# Patient Record
Sex: FEMALE | Race: WHITE
[De-identification: names, ages, dates, MRNs, and addresses within clinical notes are randomized per-mention and may not be internally consistent; named-entity substitution may affect disease eponyms.]

---

## 2022-05-11 ENCOUNTER — HOSPITAL ENCOUNTER (OUTPATIENT)
Dept: HOSPITAL 33 - SDC-PAIN | Age: 37
Discharge: HOME | End: 2022-05-11
Attending: PSYCHIATRY & NEUROLOGY
Payer: MEDICARE

## 2022-05-11 DIAGNOSIS — M54.12: Primary | ICD-10-CM

## 2022-05-11 DIAGNOSIS — Z79.899: ICD-10-CM

## 2022-05-11 PROCEDURE — 62321 NJX INTERLAMINAR CRV/THRC: CPT

## 2022-05-11 PROCEDURE — 77003 FLUOROGUIDE FOR SPINE INJECT: CPT

## 2022-05-11 PROCEDURE — 84703 CHORIONIC GONADOTROPIN ASSAY: CPT

## 2022-05-11 PROCEDURE — 72040 X-RAY EXAM NECK SPINE 2-3 VW: CPT

## 2022-05-11 NOTE — XRAY
Indication: Cervical BRINDA.



Intraoperative fluoroscopy provided for 25 seconds.  2 digital spot images

submitted for interpretation demonstrates midline posterior needle tip

projecting just posterior to cervical thoracic junction.  Small amount of

contrast injected for needle tip placement.  Correlate with intraoperative

findings/report.

## 2025-02-05 ENCOUNTER — HOSPITAL ENCOUNTER (OUTPATIENT)
Dept: HOSPITAL 33 - SDC-PAIN | Age: 40
Discharge: HOME | End: 2025-02-05
Attending: PSYCHIATRY & NEUROLOGY
Payer: MEDICARE

## 2025-02-05 DIAGNOSIS — M54.16: Primary | ICD-10-CM

## 2025-02-05 LAB — B-HCG SERPL QL: NEGATIVE

## 2025-02-05 PROCEDURE — 72100 X-RAY EXAM L-S SPINE 2/3 VWS: CPT

## 2025-02-05 PROCEDURE — 84703 CHORIONIC GONADOTROPIN ASSAY: CPT

## 2025-02-05 PROCEDURE — 36415 COLL VENOUS BLD VENIPUNCTURE: CPT

## 2025-02-05 PROCEDURE — 77003 FLUOROGUIDE FOR SPINE INJECT: CPT

## 2025-05-01 ENCOUNTER — HOSPITAL ENCOUNTER (OUTPATIENT)
Dept: HOSPITAL 33 - SDC | Age: 40
Discharge: HOME | End: 2025-05-01
Attending: STUDENT IN AN ORGANIZED HEALTH CARE EDUCATION/TRAINING PROGRAM
Payer: MEDICARE

## 2025-05-01 VITALS
RESPIRATION RATE: 20 BRPM | SYSTOLIC BLOOD PRESSURE: 125 MMHG | OXYGEN SATURATION: 98 % | DIASTOLIC BLOOD PRESSURE: 79 MMHG | HEART RATE: 101 BPM

## 2025-05-01 VITALS — TEMPERATURE: 98.3 F

## 2025-05-01 DIAGNOSIS — D16.31: ICD-10-CM

## 2025-05-01 DIAGNOSIS — M25.571: ICD-10-CM

## 2025-05-01 DIAGNOSIS — M21.621: ICD-10-CM

## 2025-05-01 DIAGNOSIS — M76.71: Primary | ICD-10-CM

## 2025-05-01 DIAGNOSIS — M24.071: ICD-10-CM

## 2025-05-01 DIAGNOSIS — M95.8: ICD-10-CM

## 2025-05-01 DIAGNOSIS — M79.671: ICD-10-CM

## 2025-05-01 DIAGNOSIS — M62.461: ICD-10-CM

## 2025-05-01 DIAGNOSIS — M21.961: ICD-10-CM

## 2025-05-01 DIAGNOSIS — M77.31: ICD-10-CM

## 2025-05-01 DIAGNOSIS — M21.371: ICD-10-CM

## 2025-05-01 DIAGNOSIS — M25.471: ICD-10-CM

## 2025-05-01 LAB
ALBUMIN SERPL-MCNC: 4.3 G/DL (ref 3.5–5)
ANION GAP SERPL CALC-SCNC: 14.4 MEQ/L (ref 5–15)
B-HCG SERPL QL: NEGATIVE
BILIRUB BLD-MCNC: 0.4 MG/DL (ref 0.2–1.3)
CALCIUM SPEC-MCNC: 9.3 MG/DL (ref 8.4–10.2)
CREAT SERPL-MCNC: 0.63 MG/DL (ref 0.52–1.04)
GFR SERPLBLD BASED ON 1.73 SQ M-ARVRAT: 115.7 ML/MIN
HCT VFR BLD AUTO: 44.3 % (ref 34.1–44.9)
HGB BLD-MCNC: 14.4 G/DL (ref 11.2–15.7)
MCHC RBC AUTO-ENTMCNC: 32.5 G/DL (ref 32.2–35.5)
PLATELET # BLD AUTO: 289 X10^3/UL (ref 182–369)
POTASSIUM SERPLBLD-SCNC: 4.1 MMOL/L (ref 3.5–5.1)
PROT SERPL-MCNC: 7.2 G/DL (ref 6.3–8.2)
RBC # BLD AUTO: 5.14 X10^6/UL (ref 3.93–5.22)
WBC # BLD AUTO: 6.7 X10^3/UL (ref 3.98–10.04)

## 2025-05-01 PROCEDURE — 27680 RELEASE OF LOWER LEG TENDON: CPT

## 2025-05-01 PROCEDURE — 29898 ANKLE ARTHROSCOPY/SURGERY: CPT

## 2025-05-01 PROCEDURE — 82248 BILIRUBIN DIRECT: CPT

## 2025-05-01 PROCEDURE — 28118 REMOVAL OF HEEL BONE: CPT

## 2025-05-01 PROCEDURE — 29891 ARTHR ANK OSTCHN DF TAL&/TIB: CPT

## 2025-05-01 PROCEDURE — 73610 X-RAY EXAM OF ANKLE: CPT

## 2025-05-01 PROCEDURE — 36415 COLL VENOUS BLD VENIPUNCTURE: CPT

## 2025-05-01 PROCEDURE — 84703 CHORIONIC GONADOTROPIN ASSAY: CPT

## 2025-05-01 PROCEDURE — 76937 US GUIDE VASCULAR ACCESS: CPT

## 2025-05-01 PROCEDURE — 27687 REVISION OF CALF TENDON: CPT

## 2025-05-01 PROCEDURE — 80053 COMPREHEN METABOLIC PANEL: CPT

## 2025-05-01 PROCEDURE — 76000 FLUOROSCOPY <1 HR PHYS/QHP: CPT

## 2025-05-01 PROCEDURE — 85027 COMPLETE CBC AUTOMATED: CPT

## 2025-05-01 PROCEDURE — 28050 BIOPSY OF FOOT JOINT LINING: CPT

## 2025-05-01 PROCEDURE — 27658 REPAIR OF LEG TENDON EACH: CPT

## 2025-05-01 RX ADMIN — GABAPENTIN ONE MG: 300 CAPSULE ORAL at 10:26

## 2025-05-01 RX ADMIN — CELECOXIB ONE MG: 100 CAPSULE ORAL at 10:26

## 2025-05-01 RX ADMIN — CEFAZOLIN SCH MLS/HR: 2 INJECTION, POWDER, FOR SOLUTION INTRAMUSCULAR; INTRAVENOUS at 10:26

## 2025-05-01 RX ADMIN — DEXAMETHASONE ONE MG: 4 TABLET ORAL at 10:26

## 2025-05-01 RX ADMIN — ACETAMINOPHEN ONE MG: 500 TABLET ORAL at 10:25
